# Patient Record
Sex: MALE | Race: WHITE | NOT HISPANIC OR LATINO | RURAL
[De-identification: names, ages, dates, MRNs, and addresses within clinical notes are randomized per-mention and may not be internally consistent; named-entity substitution may affect disease eponyms.]

---

## 2018-12-19 ENCOUNTER — OFFICE VISIT (OUTPATIENT)
Dept: RETAIL CLINIC | Facility: CLINIC | Age: 53
End: 2018-12-19

## 2018-12-19 VITALS
HEIGHT: 68 IN | BODY MASS INDEX: 33.95 KG/M2 | WEIGHT: 224 LBS | TEMPERATURE: 98 F | OXYGEN SATURATION: 98 % | RESPIRATION RATE: 12 BRPM | HEART RATE: 84 BPM

## 2018-12-19 DIAGNOSIS — J01.01 ACUTE RECURRENT MAXILLARY SINUSITIS: Primary | ICD-10-CM

## 2018-12-19 PROCEDURE — 99213 OFFICE O/P EST LOW 20 MIN: CPT | Performed by: NURSE PRACTITIONER

## 2018-12-19 RX ORDER — AMOXICILLIN AND CLAVULANATE POTASSIUM 875; 125 MG/1; MG/1
1 TABLET, FILM COATED ORAL 2 TIMES DAILY
Qty: 14 TABLET | Refills: 0 | Status: SHIPPED | OUTPATIENT
Start: 2018-12-19 | End: 2018-12-26

## 2018-12-19 RX ORDER — BENZONATATE 100 MG/1
100 CAPSULE ORAL 3 TIMES DAILY PRN
Qty: 15 CAPSULE | Refills: 0 | Status: SHIPPED | OUTPATIENT
Start: 2018-12-19 | End: 2018-12-22

## 2018-12-19 NOTE — PROGRESS NOTES
"Subjective   Cole Rashid is a 53 y.o. male.   Pulse 84   Temp 98 °F (36.7 °C)   Resp 12   Ht 172.7 cm (68\")   Wt 102 kg (224 lb)   SpO2 98%   BMI 34.06 kg/m²   Past Medical History:   Diagnosis Date   • Kidney stone    No Known Allergies        Sinusitis   This is a new problem. The current episode started in the past 7 days. The problem has been gradually worsening since onset. The pain is moderate. Associated symptoms include congestion, coughing, headaches, a hoarse voice and sinus pressure. Pertinent negatives include no chills, diaphoresis, ear pain, neck pain, shortness of breath, sneezing, sore throat or swollen glands.   Cough   Associated symptoms include headaches. Pertinent negatives include no chills, ear pain, fever, myalgias, sore throat or shortness of breath.        The following portions of the patient's history were reviewed and updated as appropriate: allergies, current medications, past family history, past medical history, past social history, past surgical history and problem list.    Review of Systems   Constitutional: Positive for activity change and fatigue. Negative for chills, diaphoresis and fever.   HENT: Positive for congestion, hoarse voice and sinus pressure. Negative for ear pain, sneezing and sore throat.    Eyes: Negative.    Respiratory: Positive for cough. Negative for shortness of breath.    Gastrointestinal: Negative.    Musculoskeletal: Negative for myalgias and neck pain.   Neurological: Positive for headaches.       Objective   Physical Exam   Constitutional: He appears well-developed and well-nourished.  Non-toxic appearance. He appears ill (minor).   HENT:   Head: Normocephalic and atraumatic.   Right Ear: Tympanic membrane and ear canal normal.   Left Ear: Tympanic membrane and ear canal normal.   Nose: Mucosal edema and rhinorrhea present. Right sinus exhibits maxillary sinus tenderness. Right sinus exhibits no frontal sinus tenderness. Left sinus exhibits " maxillary sinus tenderness. Left sinus exhibits no frontal sinus tenderness.   Mouth/Throat: Uvula is midline. Posterior oropharyngeal erythema present. No tonsillar exudate.   Cardiovascular: Regular rhythm and normal heart sounds.   Pulmonary/Chest: Effort normal. He has no wheezes. He has no rhonchi. He has no rales.   Lymphadenopathy:     He has no cervical adenopathy.   Skin: Skin is warm and dry.       Assessment/Plan   Cole was seen today for sinusitis and cough.    Diagnoses and all orders for this visit:    Acute recurrent maxillary sinusitis    Other orders  -     amoxicillin-clavulanate (AUGMENTIN) 875-125 MG per tablet; Take 1 tablet by mouth 2 (Two) Times a Day for 7 days.  -     benzonatate (TESSALON PERLES) 100 MG capsule; Take 1 capsule by mouth 3 (Three) Times a Day As Needed for Cough for up to 3 days.

## 2019-02-07 ENCOUNTER — OFFICE VISIT (OUTPATIENT)
Dept: RETAIL CLINIC | Facility: CLINIC | Age: 54
End: 2019-02-07

## 2019-02-07 VITALS
BODY MASS INDEX: 33.95 KG/M2 | WEIGHT: 224 LBS | HEART RATE: 109 BPM | DIASTOLIC BLOOD PRESSURE: 78 MMHG | OXYGEN SATURATION: 95 % | TEMPERATURE: 98.6 F | RESPIRATION RATE: 15 BRPM | SYSTOLIC BLOOD PRESSURE: 124 MMHG | HEIGHT: 68 IN

## 2019-02-07 DIAGNOSIS — J01.41 ACUTE RECURRENT PANSINUSITIS: Primary | ICD-10-CM

## 2019-02-07 PROCEDURE — 99213 OFFICE O/P EST LOW 20 MIN: CPT | Performed by: NURSE PRACTITIONER

## 2019-02-07 RX ORDER — PREDNISONE 10 MG/1
TABLET ORAL DAILY
Qty: 21 EACH | Refills: 0 | Status: SHIPPED | OUTPATIENT
Start: 2019-02-07 | End: 2019-02-13

## 2019-02-07 RX ORDER — PSEUDOEPHEDRINE HCL 120 MG/1
120 TABLET, FILM COATED, EXTENDED RELEASE ORAL EVERY 12 HOURS
Qty: 20 TABLET | Refills: 0 | Status: SHIPPED | OUTPATIENT
Start: 2019-02-07 | End: 2019-02-17

## 2019-02-07 RX ORDER — DOXYCYCLINE 100 MG/1
100 CAPSULE ORAL 2 TIMES DAILY
Qty: 20 CAPSULE | Refills: 0 | Status: SHIPPED | OUTPATIENT
Start: 2019-02-07 | End: 2019-02-17

## 2019-02-07 NOTE — PROGRESS NOTES
"Subjective   Cole Rashid is a 53 y.o. male.     Sinusitis   This is a recurrent problem. The current episode started in the past 7 days. The problem has been gradually worsening since onset. There has been no fever. The pain is moderate. Associated symptoms include congestion, coughing (mild), headaches, a hoarse voice, sinus pressure and swollen glands. Pertinent negatives include no chills, ear pain, neck pain, shortness of breath, sneezing or sore throat. Past treatments include nothing.        The following portions of the patient's history were reviewed and updated as appropriate: allergies, current medications, past medical history, past social history, past surgical history and problem list.    Review of Systems   Constitutional: Negative.  Negative for appetite change, chills and fever.   HENT: Positive for congestion, hoarse voice, postnasal drip, rhinorrhea, sinus pressure and sinus pain. Negative for ear pain, sneezing and sore throat.    Eyes: Negative.    Respiratory: Positive for cough (mild). Negative for chest tightness, shortness of breath and wheezing.    Cardiovascular: Negative.    Gastrointestinal: Negative.  Negative for diarrhea, nausea and vomiting.   Musculoskeletal: Negative.  Negative for neck pain.   Skin: Negative.    Neurological: Positive for headaches.   Hematological: Positive for adenopathy.   Psychiatric/Behavioral: Negative.         /78   Pulse 109   Temp 98.6 °F (37 °C)   Resp 15   Ht 172.7 cm (68\")   Wt 102 kg (224 lb)   SpO2 95%   BMI 34.06 kg/m²      Objective   Physical Exam   Constitutional: He is oriented to person, place, and time. Vital signs are normal. He appears well-developed and well-nourished.   HENT:   Head: Normocephalic.   Right Ear: External ear and ear canal normal. No drainage, swelling or tenderness. Tympanic membrane is bulging. Tympanic membrane is not erythematous.   Left Ear: External ear and ear canal normal. No drainage, swelling or " tenderness. Tympanic membrane is bulging. Tympanic membrane is not erythematous.   Nose: Mucosal edema and rhinorrhea present. Right sinus exhibits maxillary sinus tenderness (severe) and frontal sinus tenderness. Left sinus exhibits maxillary sinus tenderness (severe) and frontal sinus tenderness.   Mouth/Throat: Uvula is midline, oropharynx is clear and moist and mucous membranes are normal. Tonsils are 0 on the right. Tonsils are 0 on the left. No tonsillar exudate.   Eyes: Conjunctivae and EOM are normal. Pupils are equal, round, and reactive to light. Right eye exhibits no discharge. Left eye exhibits no discharge.   Neck: Normal range of motion. Neck supple.   Cardiovascular: Normal rate, regular rhythm, S1 normal, S2 normal and normal heart sounds.   Pulmonary/Chest: Effort normal and breath sounds normal. No respiratory distress. He has no wheezes.   Lymphadenopathy:        Head (right side): Tonsillar adenopathy present.        Head (left side): Tonsillar adenopathy present.     He has no cervical adenopathy.   Neurological: He is alert and oriented to person, place, and time.   Skin: Skin is warm, dry and intact. No rash noted. He is not diaphoretic.   Psychiatric: He has a normal mood and affect. His speech is normal and behavior is normal. Thought content normal.   Vitals reviewed.      Assessment/Plan   Cole was seen today for sinusitis.    Diagnoses and all orders for this visit:    Acute recurrent pansinusitis  -     doxycycline (MONODOX) 100 MG capsule; Take 1 capsule by mouth 2 (Two) Times a Day for 10 days.  -     pseudoephedrine (SUDAFED) 120 MG 12 hr tablet; Take 1 tablet by mouth Every 12 (Twelve) Hours for 10 days.  -     PredniSONE (DELTASONE) 10 MG (21) tablet pack; Take  by mouth Daily for 6 days. Use as directed on package